# Patient Record
Sex: FEMALE | Race: WHITE
[De-identification: names, ages, dates, MRNs, and addresses within clinical notes are randomized per-mention and may not be internally consistent; named-entity substitution may affect disease eponyms.]

---

## 2018-01-24 ENCOUNTER — HOSPITAL ENCOUNTER (OUTPATIENT)
Dept: HOSPITAL 56 - MW.SDS | Age: 43
Discharge: HOME | End: 2018-01-24
Attending: ORTHOPAEDIC SURGERY
Payer: COMMERCIAL

## 2018-01-24 VITALS — SYSTOLIC BLOOD PRESSURE: 118 MMHG | DIASTOLIC BLOOD PRESSURE: 68 MMHG

## 2018-01-24 DIAGNOSIS — Z91.09: ICD-10-CM
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
## 2018-01-24 DIAGNOSIS — Z79.899: ICD-10-CM

## 2018-01-24 DIAGNOSIS — S82.854A: Primary | ICD-10-CM

## 2018-01-24 PROCEDURE — 76000 FLUOROSCOPY <1 HR PHYS/QHP: CPT

## 2018-01-24 PROCEDURE — C1713 ANCHOR/SCREW BN/BN,TIS/BN: HCPCS

## 2018-01-24 PROCEDURE — 27822 TREATMENT OF ANKLE FRACTURE: CPT

## 2018-01-24 PROCEDURE — 81025 URINE PREGNANCY TEST: CPT

## 2018-01-24 RX ADMIN — FENTANYL CITRATE PRN MCG: 50 INJECTION, SOLUTION INTRAMUSCULAR; INTRAVENOUS at 17:20

## 2018-01-24 RX ADMIN — FENTANYL CITRATE PRN MCG: 50 INJECTION, SOLUTION INTRAMUSCULAR; INTRAVENOUS at 17:35

## 2018-01-24 RX ADMIN — FENTANYL CITRATE PRN MCG: 50 INJECTION, SOLUTION INTRAMUSCULAR; INTRAVENOUS at 17:25

## 2018-01-24 NOTE — PCM.PREANE
Preanesthetic Assessment





- Anesthesia/Transfusion/Family Hx


Anesthesia History: Prior Anesthesia Without Reaction


Family History of Anesthesia Reaction: No


Transfusion History: No Prior Transfusion(s)





- Review of Systems


General: No Symptoms


Pulmonary: No Symptoms


Cardiovascular: No Symptoms


Gastrointestinal: No Symptoms


Neurological: No Symptoms


Other: Reports: None





- Physical Assessment


Height: 1.7 m


Weight: 80.286 kg


ASA Class: 1


Mental Status: Alert & Oriented x3


Airway Class: Mallampati = 1


Dentition: Reports: Normal Dentition


ROM/Head Extension: Full


Lungs: Clear to Auscultation, Normal Respiratory Effort


Cardiovascular: Regular Rate, Regular Rhythm





- Allergies


Allergies/Adverse Reactions: 


 Allergies











Allergy/AdvReac Type Severity Reaction Status Date / Time


 


No Known Allergies Allergy   Verified 01/23/18 16:59














- Anesthesia Plan


Pre-Op Medication Ordered: None





- Acknowledgements


Anesthesia Type Planned: General Anesthesia


Pt an Appropriate Candidate for the Planned Anesthesia: Yes


Alternatives and Risks of Anesthesia Discussed w Pt/Guardian: Yes


Pt/Guardian Understands and Agrees with Anesthesia Plan: Yes


Additional Comments: 





states sensitive to narcotics, needs less than most people





PreAnesthesia Questionnaire


HEENT History: Reports: Other (See Below)


Other HEENT History: has lower permanent retainer


Neurological History: Reports: Other (See Below)


Other Neuro History: hx of motion sickness





- Past Surgical History


Female  Surgical History: Reports: Endometrial Ablation





- SUBSTANCE USE


Smoking Status *Q: Never Smoker


Recreational Drug Use History: No





- HOME MEDS


Home Medications: 


 Home Meds





Hydrocodone/Acetaminophen [Hydrocodon-Acetaminophen 5-325] 1 tab PO ASDIRECTED 

01/23/18 [History]











- CURRENT (IN HOUSE) MEDS


Current Meds: 





 Current Medications





Hydrocodone Bitart/Acetaminophen (Norco 325-5 Mg)  1 - 2 tab PO Q4H PRN


   PRN Reason: Pain


Cefazolin Sodium/Dextrose 2 gm (/ Premix)  50 mls @ 100 mls/hr IV ONCALL LILIAN


Lactated Ringer's (Ringers, Lactated)  1,000 mls @ 100 mls/hr IV ASDIRECTED [facility]

## 2018-01-24 NOTE — PCM.OPNOTE
- General Post-Op/Procedure Note


Date of Surgery/Procedure: 01/24/18


Operative Procedure(s): ORIF R trimalleolar ankle fracture


Post-Op Diagnosis: R trimalleolar ankle fracture


Anesthesia Technique: General ET Tube


Primary Surgeon: Emeli Rosario


Assistant: Tamanna aLmb in mLs: 15


Condition: Good


Free Text/Narrative:: 





tt=51 min


#[id number]

## 2018-01-24 NOTE — OR
SURGEON:

Emeli Rosario MD

 

DATE OF PROCEDURE:  01/24/2018

 

PREOPERATIVE DIAGNOSIS:

Right trimalleolar ankle fracture.

 

POSTOPERATIVE DIAGNOSIS:

Right trimalleolar ankle fracture.

 

PROCEDURE:

Open reduction and internal fixation of right trimalleolar ankle fracture

(medial and lateral malleolus only).

 

ASSISTANT:

Tamanna Lamb PA-C.

 

ANESTHESIA:

General.

 

ESTIMATED BLOOD LOSS:

15 mL.

 

TOURNIQUET TIME:

51 minutes.

 

COMPLICATIONS:

None.

 

DVT PROPHYLAXIS:

PAS boot to the nonoperative leg.

 

IMPLANTS USED:

[place] 1/3rd semitubular plate (7-hole) in combination of 3.5 mm nonlocking and

4.0 mm cancellous screws.

 

BRIEF HISTORY:

Parvin is a 42-year-old female, who injured her right ankle after falling on

the ice.  She was evaluated in clinic.  X-ray showed a trimalleolar ankle

fracture.  Due to the unstable nature of the injury, I did recommend surgical

treatment.  The risks and goals of procedure were discussed with the patient and

were documented preoperatively.  She agreed to proceed.

 

DESCRIPTION OF PROCEDURE:

The patient was properly identified and brought to the operating room.  She was

transferred from the OR cart and placed on the operating room table in supine

position.  General anesthesia was administered.  After adequate anesthesia was

obtained, a well-padded tourniquet was applied to the right lower extremity.

The right lower extremity was then prepped in standard fashion using ChloraPrep

solution.  It was then sterilely draped.  A time-out was performed to ensure

correct site and procedure.  Preoperative antibiotics were given.  The surgical

site had been marked preoperatively.  An Esmarch was used to exsanguinate the

right lower extremity.  The tourniquet was then inflated to 250 mmHg.

 

An incision was made over the lateral aspect of the ankle.  Subcutaneous tissues

were dissected down to the level of the bone.  Care was taken to look for the

superficial peroneal nerve and this was not encountered.  The fracture was

visualized.  It was opened and copiously irrigated with saline solution to

remove the fracture hematoma.  It was somewhat oblique in nature.  A bone

reduction clamp was then used to reduce the fracture and bring the fibula back

out to length.  Butterfly fragment was noted medially.  An Interfragmentary

screw was then placed.  Initially, the 3.5 mm cortical screw did not provide

adequate compression of the fracture.  The trajectory of the screw hole was

changed and I elected to use a 4.0 mm cancellous screw that was a longer length.

This provided good provisional fixation of the fracture.  It was felt that a 7-

hole plate was acceptable and this was contoured to the bone.  A 4.0 mm

cancellous screws were then used distally and 3.5 mm cortical screws were used

proximally.  C-arm imaging confirmed adequate reduction of the fracture and

restoration of the fibular length.

 

I then turned my attention to the medial malleolus.  An incision was made over

this.  The subcutaneous tissues were incised.  I did not encounter the saphenous

vein or nerve.  The periosteum was overlying the fracture site.  This was

elevated and the fracture site was identified.  This was copiously irrigated

including the ankle joint.  There did not appear to be any articular damage to

the underlying talus.  The fracture was quite small in nature.  Two K-wires were

then passed from distal to proximal incorporating the small piece of bone.  The

bony fragment also was coming off mainly the anterior portion of the medial

malleolus as a large posterior piece remained intact.  One K-wire was able to

easily be visualized and did not extend intra-articularly.  I did try multiple

times with the second K-wire in a more medial position as there was no more room

laterally.  I was unable to pass the K-wire into the proximal fragment without

penetrating intra-articularly.  One K-wire was over drilled and a partially

threaded 4.0 mm cancellous screw was placed with a washer.  This provided

excellent compression at the fracture site and did give good stability.  As the

fracture appeared to be quite stable, I elected to proceed with only one screw.

The wounds were then copiously irrigated with saline solution.  Final C-arm

images confirmed acceptable reduction of the fracture with good restoration of

the ankle mortise.  The incision sites were then copiously irrigated with saline

solution.  The deep tissues were closed with 0 Vicryl.  Tourniquet was deflated.

No excess bleeding was noted.  The subcutaneous tissues were closed with 2-0

Monocryl and the skin was closed with staples.  Xeroform gauze was placed over

the wound and a bulky dressing was applied.  She was placed in a well-padded

posterior splint with medial and lateral stabilizing slabs.  She was awakened

from her anesthetic and transferred back to the operating room cart.  She was

brought to recovery room in stable condition.  All needle and sponge counts were

correct.

 

 

TERRA / BISMARK

DD:  01/24/2018 17:11:19

DT:  01/24/2018 23:15:44

Job #:  [id number]

## 2018-01-25 NOTE — CR
EXAMINATION: Right ankle

 

HISTORY: ORIF

 

COMPARISON: None

 

TECHNIQUE: 3 fluoroscopic images provided.

 

FINDINGS/IMPRESSION: Operative control films demonstrate screw and plate fixation of a distal fibular
 fracture and a single screw fixating the medial malleolus.

## 2023-05-02 ENCOUNTER — HOSPITAL ENCOUNTER (OUTPATIENT)
Dept: HOSPITAL 56 - MW.SDS | Age: 48
Discharge: HOME | End: 2023-05-02
Attending: SURGERY
Payer: COMMERCIAL

## 2023-05-02 VITALS — SYSTOLIC BLOOD PRESSURE: 109 MMHG | DIASTOLIC BLOOD PRESSURE: 75 MMHG | HEART RATE: 66 BPM

## 2023-05-02 DIAGNOSIS — Z79.899: ICD-10-CM

## 2023-05-02 DIAGNOSIS — Z91.048: ICD-10-CM

## 2023-05-02 DIAGNOSIS — Z12.11: Primary | ICD-10-CM

## 2023-05-02 DIAGNOSIS — Z91.012: ICD-10-CM

## 2023-05-02 DIAGNOSIS — Z91.011: ICD-10-CM

## 2023-05-02 DIAGNOSIS — Z91.018: ICD-10-CM

## 2023-05-02 PROCEDURE — 45378 DIAGNOSTIC COLONOSCOPY: CPT

## 2023-05-02 PROCEDURE — 81025 URINE PREGNANCY TEST: CPT
